# Patient Record
Sex: FEMALE | Race: ASIAN
[De-identification: names, ages, dates, MRNs, and addresses within clinical notes are randomized per-mention and may not be internally consistent; named-entity substitution may affect disease eponyms.]

---

## 2018-09-17 ENCOUNTER — HOSPITAL ENCOUNTER (OUTPATIENT)
Dept: HOSPITAL 92 - SCSMRI | Age: 33
Discharge: HOME | End: 2018-09-17
Attending: FAMILY MEDICINE
Payer: COMMERCIAL

## 2018-09-17 DIAGNOSIS — M53.3: Primary | ICD-10-CM

## 2018-09-17 PROCEDURE — 72148 MRI LUMBAR SPINE W/O DYE: CPT

## 2018-09-17 NOTE — MRI
LUMBAR SPINE AND SACROILIAC JOINT MRI:

 

HISTORY:

The patient fell six years ago.  Low back pain and SI joint dysfunction.

 

COMPARISON:

None.

 

TECHNIQUE:

A lumbar spine MRI is performed without intravenous Gadolinium administration.  Multisequential, mult
iplanar imaging is performed.

 

Evaluation of the sacroiliac joints is performed with axial and coronal weighted images.

 

FINDINGS:

The visualized uterus and adnexal structures are grossly unremarkable.  Bilateral ovarian follicle ar
e noted.  A dominant right ovarian follicle is identified.  There is nonspecific fluid in the endomet
rial canal.

 

The sacrum has an appropriate T1 marrow signal intensity.  The sacroiliac joints appear to have symme
tric signal intensity.  There does appear to be some nonspecific sclerosis of the sacrum and carlos, al
lyric the SI joint.

 

Lumbar vertebral body heights are maintained.  No fracture.  Appropriate T1 marrow signal intensity o
f the lumbar vertebrae.  No significant STIR hyperintensity to suggest lumbar spine vertebral body ed
ema or ligamentous injury.

 

The conus medullaris terminates at the L1-L2 disk space.  Symmetric signal intensity of the psoas mus
cles and visualized solid organs.

 

T12-L1:  Adequate disk hydration.  No significant central canal stenosis.  The foramina are patent.

 

L1-L2:  Adequate disk hydration.  No significant central canal stenosis or foraminal narrowing.  The 
neural foramina are patent.

 

L2-L3:  Adequate disk hydration.  No significant central canal stenosis.  The foramina are patent.

 

L3-L4:  Adequate disk hydration.  No significant central canal stenosis.  The foramina are patent.

 

L4-L5:  Adequate disk hydration.  No significant central canal stenosis.  The foramina are patent.

 

L5-S1:  No significant posterior disk abnormality.  No significant central canal stenosis.  The neura
l foramina are patent.  Adequate disk hydration.

 

IMPRESSION:

1.  No significant central canal stenosis or foraminal narrowing.

 

2.  Nonspecific sclerosis involving the articular aspect of the sacrum and iliac bone at the left and
 right sacroiliac joints.  Sclerosis is noted on previous CT from 2013 and is of doubtful significanc
e.  Even though there is sclerosis, which is nonspecific, the sacroiliac joint spaces appear to be sy
mmetric and preserved.  There are no erosive or destructive changes appreciated by MRI.

 

POS: [facility]